# Patient Record
Sex: FEMALE | Race: WHITE | NOT HISPANIC OR LATINO | URBAN - METROPOLITAN AREA
[De-identification: names, ages, dates, MRNs, and addresses within clinical notes are randomized per-mention and may not be internally consistent; named-entity substitution may affect disease eponyms.]

---

## 2019-08-27 ENCOUNTER — IMPORTED ENCOUNTER (OUTPATIENT)
Dept: URBAN - METROPOLITAN AREA CLINIC 31 | Facility: CLINIC | Age: 64
End: 2019-08-27

## 2019-08-27 PROBLEM — H40.013: Noted: 2019-08-27

## 2019-08-27 PROCEDURE — 92133 CPTRZD OPH DX IMG PST SGM ON: CPT

## 2019-08-27 PROCEDURE — 92015 DETERMINE REFRACTIVE STATE: CPT

## 2019-08-27 PROCEDURE — 92004 COMPRE OPH EXAM NEW PT 1/>: CPT

## 2019-08-27 NOTE — PATIENT DISCUSSION
1.  Glaucoma suspect OU - Has been a suspect for many years with minimal change. Large cups with significant asymmetry and mild NFL depression OU on OCT. Follow up in 6 months to ensure no change. If no change then suspect congenital.  Will continue to monitor. 2. Refractive error - Glasses change optional. 3.  Return for an appointment in 6 months for pressure check VF 24-2 and OCT Nerve with Dr. Jad Crowe.

## 2020-09-02 ENCOUNTER — IMPORTED ENCOUNTER (OUTPATIENT)
Dept: URBAN - METROPOLITAN AREA CLINIC 31 | Facility: CLINIC | Age: 65
End: 2020-09-02

## 2020-09-02 PROBLEM — H40.1232: Noted: 2020-09-02

## 2020-09-02 PROCEDURE — 92133 CPTRZD OPH DX IMG PST SGM ON: CPT

## 2020-09-02 PROCEDURE — 92014 COMPRE OPH EXAM EST PT 1/>: CPT

## 2020-09-02 PROCEDURE — 92083 EXTENDED VISUAL FIELD XM: CPT

## 2020-09-02 PROCEDURE — 92015 DETERMINE REFRACTIVE STATE: CPT

## 2020-09-02 NOTE — PATIENT DISCUSSION
1.  Low Tension Glaucoma OU - Significant arcuate VF loss OD>OS even though OCT relatively stable. Discussed importance of compliance with visits and gtt. Mother has glaucoma. Start [facility] OU - sample given. 2. Return for an appointment in 2 weeks for pressure check pach and repeat VF 24-2 with Dr. Kaylen Brunner.

## 2020-09-23 ENCOUNTER — IMPORTED ENCOUNTER (OUTPATIENT)
Dept: URBAN - METROPOLITAN AREA CLINIC 31 | Facility: CLINIC | Age: 65
End: 2020-09-23

## 2020-09-23 PROBLEM — H40.1232: Noted: 2020-09-23

## 2020-09-23 PROCEDURE — 99213 OFFICE O/P EST LOW 20 MIN: CPT

## 2020-09-23 PROCEDURE — 76514 ECHO EXAM OF EYE THICKNESS: CPT

## 2020-09-23 PROCEDURE — 92083 EXTENDED VISUAL FIELD XM: CPT

## 2020-09-23 NOTE — PATIENT DISCUSSION
1.  Low Tension Glaucoma OU - Significant arcuate VF loss OD>OS even though OCT relatively stable. Thinner than average corneas. Good IOP response to Lumigan. IOP goal 14 mmHg. Discussed importance of compliance with visits and gtt. Mother has glaucoma. Continue Lumigan QHS OU.2. Return for an appointment in 6 months and VF 24-2 with Dr. Clementina Meyer. 3.  Have IOP check in 3 months while in [place] with daughter.

## 2020-10-22 NOTE — PATIENT DISCUSSION
Continue: prednisol ace-gatiflox-bromfen (prednisol ace-gatiflox-bromfen): drops,suspension: 1-0.5-0.075% 1 drop four times a day into affected eye 09-

## 2020-10-22 NOTE — PATIENT DISCUSSION
Pre-Op 2nd Eye Counseling: The patient has noticed an improvement in their visual symptoms in the operative eye. The patient complains of decreased vision in the fellow eye when reading and watching tv. It was explained to the patient that the decision to proceed with cataract surgery in the fellow eye is entirely a separate decision from the surgical eye. All of the same risks, benefits and alternatives ere reviewed with the patient again. The patient does feel the vision in the non-operative eye is limiting their daily activities and elects to proceed with cataract surgery in the left eye.

## 2020-10-22 NOTE — PATIENT DISCUSSION
PATIENT DESIRES STANDARD LENS OS FOR CAT SX. PT UNDERSTANDS SHE WILL NEED GLASSES FOR READING AND MAY NEED GLASSES FOR DISTANCE DUE TO ANY UNCORRECTED ASTIGMATISM.

## 2020-10-22 NOTE — PATIENT DISCUSSION
S/P PHACO W/IOL,OD: FLUID RELEASED FROM AB EXTERNA INCISION AT SLIT LAMP WITHOUT DIFFICULTY. REPEAT IOP CHECK  (22mmhg). INSTILLED ONE DROP OF ANTIBIOTIC IMMEDIATELY. ADD COMBIGAN OD BID TO CONTROL IOP UNTIL NEXT POST OP VISIT. CONTINUE TO TAPER DROPS AS DIRECTED. DISCONTINUE ANTIBIOTIC DROP IN 1 WEEK. RETURN FOR FOLLOW-UP AS SCHEDULED.

## 2020-10-28 NOTE — PATIENT DISCUSSION
S/P PC IOL OD:  CONTINUE COMBINATION DROP QID AND WILL ADD  DEYSI 128 DROPS TID OR OINTMENT AT BEDTIME. ALSO CONTINUE COMBIGAN (SAMPLE GIVEN) BID OD FOR ONE MORE WEEK. STRESSED IMPORTANCE OF GETTING ALL THE DROPS IN [facility] DAY AND WAITING 5 MINS BETWEEN ANY DROPS.

## 2020-11-05 NOTE — PATIENT DISCUSSION
DRY EYES POSTOP OD WITH K EDEMA- CONTINUE DEYSI OINTMENT OD QHS AND ADD PRESERVATIVE FREE TEARS OD Q 2 HOURS.

## 2020-11-11 NOTE — PATIENT DISCUSSION
New Prescription: prednisolone acetate (prednisolone acetate): drops,suspension: 1% 1 drop three times a day into right eye 11-

## 2020-11-11 NOTE — PATIENT DISCUSSION
Stopped Today: prednisol ace-gatiflox-bromfen (prednisol ace-gatiflox-bromfen): drops,suspension: 1-0.5-0.075% 1 drop four times a day into affected eye 09-

## 2020-11-11 NOTE — PATIENT DISCUSSION
SEVERE DRY EYE OU W/ MCE OD:  SEVERE DRY EYES. PT USING DEYSI AT NIGHT AND PF ARTIFICIAL TEARS 5-6 TIMES PER DAY. VERY HAZY VIEW, BUT RETINA APPEARS OK. REFER TO DR Johnny Rodriguez FOR EVAL AND TREATMENT STAT.

## 2021-04-23 ENCOUNTER — IMPORTED ENCOUNTER (OUTPATIENT)
Dept: URBAN - METROPOLITAN AREA CLINIC 31 | Facility: CLINIC | Age: 66
End: 2021-04-23

## 2021-04-23 PROBLEM — H40.1232: Noted: 2021-04-23

## 2021-04-23 PROCEDURE — 92014 COMPRE OPH EXAM EST PT 1/>: CPT

## 2021-04-23 PROCEDURE — 92083 EXTENDED VISUAL FIELD XM: CPT

## 2021-04-23 NOTE — PATIENT DISCUSSION
1.  Low Tension Glaucoma OU - Stable arcuate VF loss OD>OS with IOPs at goal of 14 or lower. Thinner than average corneas. Discussed importance of compliance with visits and gtt. Mother has glaucoma. Continue latanoprost QHS OU.2. Return for an appointment in 6 months for comprehensive exam. VF 24-2. with Dr. Nurys Barraza.

## 2021-11-23 ENCOUNTER — IMPORTED ENCOUNTER (OUTPATIENT)
Dept: URBAN - METROPOLITAN AREA CLINIC 31 | Facility: CLINIC | Age: 66
End: 2021-11-23

## 2021-11-23 PROBLEM — H25.13: Noted: 2021-11-23

## 2021-11-23 PROBLEM — H40.1232: Noted: 2021-11-23

## 2021-11-23 PROCEDURE — 99214 OFFICE O/P EST MOD 30 MIN: CPT

## 2021-11-23 PROCEDURE — 92133 CPTRZD OPH DX IMG PST SGM ON: CPT

## 2021-11-23 NOTE — PATIENT DISCUSSION
1.  Nuclear Sclerotic Cataract OU: Explained how cataracts can effect vision. Recommend clinical observation. The patient was advised to contact us if any change or worsening of vision. 2. Low Tension Glaucoma OU - Stable arcuate VF loss OD>OS with IOPs at goal of 14 or lower. Thinner than average corneas. Discussed importance of compliance with visits and gtt. Mother has glaucoma. Continue latanoprost QHS OU.3. Return for an appointment in 4 months for pressure check and VF 24-2. with Dr. Audi Green.

## 2021-11-23 NOTE — PATIENT DISCUSSION
1.  Low Tension Glaucoma OU - Stable arcuate VF loss OD>OS with IOPs at goal of 14 or lower. Thinner than average corneas. Discussed importance of compliance with visits and gtt. Mother has glaucoma. Continue latanoprost QHS OU.2. Return for an appointment in 4 months for pressure check and VF 24-2. with Dr. Eileen Romero.

## 2022-03-23 ENCOUNTER — IMPORTED ENCOUNTER (OUTPATIENT)
Dept: URBAN - METROPOLITAN AREA CLINIC 31 | Facility: CLINIC | Age: 67
End: 2022-03-23

## 2022-03-23 PROBLEM — H25.12: Noted: 2022-03-23

## 2022-03-23 PROBLEM — H25.13: Noted: 2022-03-23

## 2022-03-23 PROBLEM — H40.1232: Noted: 2022-03-23

## 2022-03-23 PROBLEM — H25.11: Noted: 2022-03-23

## 2022-03-23 PROCEDURE — 99214 OFFICE O/P EST MOD 30 MIN: CPT

## 2022-03-23 PROCEDURE — 92083 EXTENDED VISUAL FIELD XM: CPT

## 2022-03-23 NOTE — PATIENT DISCUSSION
1.  Nuclear Sclerotic Cataract OU: Monitor. 2. Low Tension Glaucoma OU - Stable arcuate VF loss OD>OS with IOPs at goal of 14 or lower. Thinner than average corneas. Discussed importance of compliance with visits and gtt. Mother has glaucoma. Continue latanoprost QHS OU.3. Return for an appointment in 6 months for complete exam with Dr. Eileen Romero.

## 2022-04-01 ASSESSMENT — TONOMETRY
OS_IOP_MMHG: 16
OD_IOP_MMHG: 18
OD_IOP_MMHG: 13
OS_IOP_MMHG: 18
OS_IOP_MMHG: 18
OS_IOP_MMHG: 13
OS_IOP_MMHG: 14
OD_IOP_MMHG: 13
OD_IOP_MMHG: 18
OS_IOP_MMHG: 13
OD_IOP_MMHG: 14
OD_IOP_MMHG: 12

## 2022-04-01 ASSESSMENT — VISUAL ACUITY
OS_SC: 20/25-3
OD_CC: 20/80-2
OD_SC: 20/20
OU_SC: 20/20
OU_CC: 20/20
OS_SC: 20/20
OS_SC: 20/20-1
OS_SC: 20/200
OD_SC: 20/25-2
OU_CC: 20/20
OS_SC: 20/20
OD_SC: 20/200
OD_SC: 20/25
OS_SC: 20/25+2
OD_SC: 20/30-1
OS_CC: 20/150-2
OD_SC: 20/20-2

## 2022-09-23 ENCOUNTER — PREPPED CHART (OUTPATIENT)
Dept: URBAN - METROPOLITAN AREA CLINIC 29 | Facility: CLINIC | Age: 67
End: 2022-09-23

## 2022-09-23 NOTE — PATIENT DISCUSSION
1.  Nuclear Sclerotic Cataract OU: Monitor. 2. Low Tension Glaucoma OU - Stable arcuate VF loss OD>OS with IOPs at goal of 14 or lower. Thinner than average corneas. Discussed importance of compliance with visits and gtt. Mother has glaucoma. Continue latanoprost QHS OU.3. Return for an appointment in 6 months for complete exam with Dr. Shmuel Sears.

## 2023-09-12 ENCOUNTER — COMPREHENSIVE EXAM (OUTPATIENT)
Dept: URBAN - METROPOLITAN AREA CLINIC 29 | Facility: CLINIC | Age: 68
End: 2023-09-12

## 2023-09-12 DIAGNOSIS — H52.03: ICD-10-CM

## 2023-09-12 DIAGNOSIS — H52.223: ICD-10-CM

## 2023-09-12 DIAGNOSIS — H52.4: ICD-10-CM

## 2023-09-12 DIAGNOSIS — H25.13: ICD-10-CM

## 2023-09-12 DIAGNOSIS — H40.1232: ICD-10-CM

## 2023-09-12 PROCEDURE — 99214 OFFICE O/P EST MOD 30 MIN: CPT

## 2023-09-12 PROCEDURE — 92015 DETERMINE REFRACTIVE STATE: CPT

## 2023-09-12 ASSESSMENT — TONOMETRY
OS_IOP_MMHG: 13
OD_IOP_MMHG: 12

## 2023-09-12 ASSESSMENT — VISUAL ACUITY
OD_PH: 20/20
OS_PH: 20/25
OD_CC: 20/40
OS_CC: 20/40

## 2023-09-12 ASSESSMENT — PACHYMETRY
OS_CT_UM: 527
OD_CT_UM: 523

## 2023-10-09 ENCOUNTER — ESTABLISHED PATIENT (OUTPATIENT)
Dept: URBAN - METROPOLITAN AREA CLINIC 29 | Facility: CLINIC | Age: 68
End: 2023-10-09

## 2023-10-09 DIAGNOSIS — H25.13: ICD-10-CM

## 2023-10-09 DIAGNOSIS — H40.1232: ICD-10-CM

## 2023-10-09 PROCEDURE — 92083 EXTENDED VISUAL FIELD XM: CPT

## 2023-10-09 PROCEDURE — 99213 OFFICE O/P EST LOW 20 MIN: CPT

## 2023-10-09 ASSESSMENT — VISUAL ACUITY
OD_CC: 20/20
OS_CC: 20/20-1

## 2023-10-09 ASSESSMENT — TONOMETRY
OS_IOP_MMHG: 16
OD_IOP_MMHG: 14

## 2024-04-09 ENCOUNTER — COMPREHENSIVE EXAM (OUTPATIENT)
Dept: URBAN - METROPOLITAN AREA CLINIC 29 | Facility: CLINIC | Age: 69
End: 2024-04-09

## 2024-04-09 DIAGNOSIS — H52.03: ICD-10-CM

## 2024-04-09 DIAGNOSIS — H52.4: ICD-10-CM

## 2024-04-09 DIAGNOSIS — H52.223: ICD-10-CM

## 2024-04-09 DIAGNOSIS — H25.13: ICD-10-CM

## 2024-04-09 DIAGNOSIS — H40.1232: ICD-10-CM

## 2024-04-09 PROCEDURE — 92015 DETERMINE REFRACTIVE STATE: CPT

## 2024-04-09 PROCEDURE — 99214 OFFICE O/P EST MOD 30 MIN: CPT

## 2024-04-09 PROCEDURE — 92083 EXTENDED VISUAL FIELD XM: CPT

## 2024-04-09 ASSESSMENT — TONOMETRY
OS_IOP_MMHG: 11
OD_IOP_MMHG: 11

## 2024-04-09 ASSESSMENT — VISUAL ACUITY
OD_CC: 20/20
OS_CC: 20/30-2
OS_CC: 20/20-1
OD_CC: 20/25-3

## 2024-04-12 ENCOUNTER — CONSULTATION/EVALUATION (OUTPATIENT)
Dept: URBAN - METROPOLITAN AREA CLINIC 29 | Facility: CLINIC | Age: 69
End: 2024-04-12

## 2024-04-12 DIAGNOSIS — H25.13: ICD-10-CM

## 2024-04-12 PROCEDURE — 76519 ECHO EXAM OF EYE: CPT

## 2024-04-12 PROCEDURE — 99214 OFFICE O/P EST MOD 30 MIN: CPT

## 2024-04-12 ASSESSMENT — VISUAL ACUITY
OD_CC: 20/25-2
OS_CC: 20/25
OS_CC: 20/40-2
OD_CC: 20/20
OD_GLARE: 20/40
OS_GLARE: 20/50

## 2024-04-12 ASSESSMENT — TONOMETRY
OD_IOP_MMHG: 10
OS_IOP_MMHG: 11

## 2024-09-09 ENCOUNTER — SURGERY/PROCEDURE (OUTPATIENT)
Dept: URBAN - METROPOLITAN AREA SURGERY 17 | Facility: SURGERY | Age: 69
End: 2024-09-09

## 2024-09-09 DIAGNOSIS — H25.11: ICD-10-CM

## 2024-09-09 PROCEDURE — 66984 XCAPSL CTRC RMVL W/O ECP: CPT

## 2024-09-10 ENCOUNTER — POST-OP (OUTPATIENT)
Dept: URBAN - METROPOLITAN AREA CLINIC 29 | Facility: CLINIC | Age: 69
End: 2024-09-10

## 2024-09-10 DIAGNOSIS — Z96.1: ICD-10-CM

## 2024-09-10 PROCEDURE — 99024 POSTOP FOLLOW-UP VISIT: CPT

## 2024-09-18 ENCOUNTER — SURGERY/PROCEDURE (OUTPATIENT)
Dept: URBAN - METROPOLITAN AREA SURGERY 17 | Facility: SURGERY | Age: 69
End: 2024-09-18

## 2024-09-18 DIAGNOSIS — H25.12: ICD-10-CM

## 2024-09-18 PROCEDURE — 66984 XCAPSL CTRC RMVL W/O ECP: CPT | Mod: 79,LT

## 2024-09-19 ENCOUNTER — POST-OP (OUTPATIENT)
Dept: URBAN - METROPOLITAN AREA CLINIC 29 | Facility: CLINIC | Age: 69
End: 2024-09-19

## 2024-09-19 DIAGNOSIS — Z96.1: ICD-10-CM

## 2024-09-27 ENCOUNTER — POST-OP (OUTPATIENT)
Dept: URBAN - METROPOLITAN AREA CLINIC 29 | Facility: CLINIC | Age: 69
End: 2024-09-27

## 2024-09-27 DIAGNOSIS — Z96.1: ICD-10-CM

## 2024-10-24 ENCOUNTER — POST-OP (OUTPATIENT)
Dept: URBAN - METROPOLITAN AREA CLINIC 29 | Facility: CLINIC | Age: 69
End: 2024-10-24

## 2024-10-24 DIAGNOSIS — Z96.1: ICD-10-CM

## 2024-10-24 PROCEDURE — 99024 POSTOP FOLLOW-UP VISIT: CPT

## 2025-02-17 ENCOUNTER — COMPREHENSIVE EXAM (OUTPATIENT)
Age: 70
End: 2025-02-17

## 2025-02-17 DIAGNOSIS — H04.123: ICD-10-CM

## 2025-02-17 DIAGNOSIS — H40.1232: ICD-10-CM

## 2025-02-17 DIAGNOSIS — Z96.1: ICD-10-CM

## 2025-02-17 PROCEDURE — 99214 OFFICE O/P EST MOD 30 MIN: CPT

## 2025-02-17 PROCEDURE — 92250 FUNDUS PHOTOGRAPHY W/I&R: CPT

## 2025-06-17 ENCOUNTER — FOLLOW UP (OUTPATIENT)
Age: 70
End: 2025-06-17

## 2025-06-17 DIAGNOSIS — Z96.1: ICD-10-CM

## 2025-06-17 DIAGNOSIS — H04.123: ICD-10-CM

## 2025-06-17 DIAGNOSIS — H40.1232: ICD-10-CM

## 2025-06-17 PROCEDURE — 99213 OFFICE O/P EST LOW 20 MIN: CPT

## 2025-06-17 PROCEDURE — 92083 EXTENDED VISUAL FIELD XM: CPT
